# Patient Record
Sex: FEMALE | Race: BLACK OR AFRICAN AMERICAN | Employment: FULL TIME | URBAN - METROPOLITAN AREA
[De-identification: names, ages, dates, MRNs, and addresses within clinical notes are randomized per-mention and may not be internally consistent; named-entity substitution may affect disease eponyms.]

---

## 2019-04-05 ENCOUNTER — HOSPITAL ENCOUNTER (OUTPATIENT)
Dept: PHYSICAL THERAPY | Age: 59
Discharge: HOME | End: 2019-04-05
Payer: COMMERCIAL

## 2019-04-05 ENCOUNTER — APPOINTMENT (OUTPATIENT)
Dept: PHYSICAL THERAPY | Age: 59
End: 2019-04-05
Payer: COMMERCIAL

## 2019-04-05 PROCEDURE — 97110 THERAPEUTIC EXERCISES: CPT

## 2019-04-05 PROCEDURE — 97162 PT EVAL MOD COMPLEX 30 MIN: CPT

## 2019-04-05 NOTE — PROGRESS NOTES
PHYSICAL THERAPY - DAILY TREATMENT NOTE Patient Name: Sabas Nyhan        Date: 2019 : 1960   YES Patient  Verified Visit #:   1     Insurance: Payor: BLUE CROSS / Plan: [place] BLUE [place] FEDERAL / Product Type: PPO / In time: 10:10 Out time: 11:20 Total Treatment Time: 70 Medicare Time Tracking (below) Total Timed Codes (min):  8 1:1 Treatment Time:  8  
TREATMENT AREA = Right hip pain [M25.551] SUBJECTIVE Pain Level (on 0 to 10 scale):    / 10 Medication Changes/New allergies or changes in medical history, any new surgeries or procedures? YES    If yes, update Summary List  
Subjective Functional Status/Changes:  []  No changes reported CC: Right hip pain and entire right sided pain. HARIKA/HPI: Pt reports chronic right hip pain however 2 weeks ago she had to go to the ER d/t right sided pain, weakness and a migraine. Pt denies any HARIKA/trauma to cause the injury. Pt reports the MD r/o stroke. Pt reports her migraine has improved. Pt reports pain on right side. Pt reports numbness/tingling along right thigh and hand. Pt reports numbness down legs get worse when she stands up. Warm shower makes symptoms feel better. Pt denies any saddle paraesthesia or changes in bowel/bladder. Pt had imaging and reports protrusion on hips. perpt, according to her imaging the hip is getting worse. Pt able to go down stairs but going up stairs is difficult. Pt sleep is interrupted every night. 3 days this week her sleep has been interrupted. Pt reports the inability to \"lift stuff up\" Pt reports 3 week hx of right shoulder pain. Pt is RHD. Pt shoulder pain is aggravated by lifting and relieved by movement below shoulder height. Pt has a hx of dropping items. Pt reports it takes her 2 hours to get mobile in the morning. Pmhx:Chronic hip pain Symptoms Pain Today:6/10 Best:10 Worst:10/10 Aggravating factors:Standing, walking, stairs Relieving factors: hot shower, medication, move around, laying on left side Occupational tasks: : mostly sitting Difficulty/Pain with functional activities: 
[x] Yes [] No   Sit<>stand 
[x] Yes [] No Squatting 
[x] Yes [] No Lifting 
[x] Yes [] No Sitting Reece Lees - No Walking (10 minutes) [x] Yes [] No Stairs OBJECTIVE Palpation: Pt TTP along right lateral hip, length of ITB, middle trap, rhomboids delto-pectoral groove and posterior cuff. Gait:Pt ambulated independently with bilateral trendelenburgh Stair ambulation: Pt ambulated 3 steps with bilateral HR demonstrating step to pattern leading with left (ascending) leading with right (descending) Balance: 
Rhomberg EO: 30 seconds Rhomberg EC: 30 seconds ROM / Strength Shoulder A/PROM Left Right Flexion [facility] Abduction [facility]  
ER [facility] WFL  
IR T8 L4 Extension [facility] WFL  
 
    LE                                          AROM                     Strength (1-5) Left Right Left Right Hip Flexion 85 deg 85 deg 4/5 4-/5 Extension 0 deg 0 deg 4/5 4-/5 Abduction   4/5 4-/5 Knee Flexion WNL WNL 4+/5 4+/5 Extension WNL WNL 4/5 4/5 Ankle Plantarflexion   4-/5 4-/5 Dorsiflexion   4-/5 4-/5  
 
UE Strength Left Right Shoulder flexion 4/5 4-/5 Shoulder ext. 4+/5 4+/5 Shoulder abd. 4/5 3/5 Shoulder IR 4+/5 4/5 Shoulder ER 4+/5 4/5 Elbow flexion 4/5 4/5 Elbow extension 4/5 4/5  strength 20 kg 23 kg Flexibility:  
Hamstrings: (L) Tightness= [x] WNL   [] Min   [] Mod   [] Severe (R) Tightness= [x] WNL   [] Min   [] Mod   [] Severe Quadriceps: (L) Tightness= [] WNL   [] Min   [x] Mod   [] Severe (R) Tightness= [] WNL   [] Min   [x] Mod   [] Severe Gastroc: (L) Tightness= [] WNL   [x] Min   [] Mod   [] Severe (R) Tightness= [] WNL   [x] Min   [] Mod   [] Severe Special tests: 
Scour: (+) right Howard test  Positive (bilaterally) Therapeutic Procedures: 
Min Procedure Specifics + Rationale  
n/a [x]  Patient Education (performed throughout session) [x] Review HEP [] Progressed/Changed HEP based on:  
[] proper performance and advancement of Therex/TA [] reduction in pain level   
[] increased functional capacity [] change in directional preference  
8 [x] Therapeutic Exercise [x]  See Flowsheet Rationale: increase ROM and increase strength to improve the patients ability to participate in ADL's Modality rationale: decrease inflammation, decrease pain, increase tissue extensibility and increase muscle contraction/control to improve the patients ability to perform ADL's with greater ease Min Type Additional Details 10 [x]  Heat          [] pre-TOBIAS          [x] post-TOBIAS Location: Right shoulder and hip [x] seated              [] prone 
[] legs elevated    [] legs flat [x] Skin assessment post-treatment:  [x]intact [x]redness- no adverse reaction 
     []redness  adverse reaction:  
Other Objective/Functional Measures: 
 
See objective measurements above. Pt demonstrated and verbalized independence with HEP. Post Treatment Pain Level (on 0 to 10) scale:   6 / 10 ASSESSMENT Assessment/Changes in Function: See POC Justification for Eval Code Complexity: Moderate Patient History (low 0, mod 1-2, high 3-4): Moderate: Chronic hip and 3 week hx of right shoulder pain Examination (low 1-2, mod 3+, high 4+): Moderate: Decreased UE/LE strength, decreased activity tolerance, abnormal gait mechanics Clinical Presentation (low: stable/uncomplicated; mod: evolving; high: unstable/unpredictable): Moderate Clinical Decision Making (low , mod 26-74, high 1-25): Moderate: FOTO: 31 
 
[x]  See Plan of Care 
[]  See Progress Note/ Recertification 
[]  See Discharge Summary Patient will continue to benefit from skilled PT services to modify and progress therapeutic interventions, address functional mobility deficits, address ROM deficits, address strength deficits, analyze and address soft tissue restrictions, analyze and cue movement patterns, analyze and modify body mechanics/ergonomics, assess and modify postural abnormalities, address imbalance/dizziness and instruct in home and community integration  to attain remaining goals Progress toward goals / Updated goals: 
See POC PLAN [x]  Upgrade activities as tolerated 
[x]  Update interventions per flow sheet YES Continue plan of care  
[]  Discharge due to :   
[]  Other:   
 
Therapist: Stephan Chavira DPT Date: 4/5/2019 Time: 7:40 AM  
 
Future Appointments Date Time Provider Dong Isaacs 4/5/2019 10:00 AM Davey Golden [facility] AT [facility]

## 2019-04-05 NOTE — PROGRESS NOTES
Huyen Brandon 31  [facility] PHYSICAL THERAPY 
[address], [place], [place] [address] [place] - Phone: [phone]  Fax: [phone] PLAN OF CARE / STATEMENT OF MEDICAL NECESSITY FOR PHYSICAL THERAPY SERVICES Patient Name: Mounika Santos : 1960 Medical  
Diagnosis: Right hip pain [M25.551] Treatment Diagnosis: Right hip and shoulder pain Onset Date: Hip: chronic Shoulder: 3 weeks Referral Source: Brea Nail* Start of Care [facility]): 2019 Prior Hospitalization: See medical history Provider #: [id number] Prior Level of Function: Progressive decline in ability to complete ADL's and functional activities. Comorbidities: Osteoporosis, DM, HTN, BMI Medications: Verified on Patient Summary List  
The Plan of Care and following information is based on the information from the initial evaluation.  
=========================================================================================== Assessment / key information:  Pt is a 63year old female who presents to PT today with c/ochronic right hip and 3 week hx of right shoulder pain. Pt initially went to the Er d/t possible CVA however CVA r/o. Upon evaluation, signs and symptoms consistent with  Right hip OA and right shoulder strain/sprain. Pt overall condition can be linked to a general deconditioning. Pt presents with abnormal posture, decreased UE/LE strength, decreased UE/LE flexibility and decreased activity tolerance. Pt report an overall walking/standing tolerance of 10 minutes and leads a sedentary lifestyle. Pt ambulates independently however demonstrates a bilateral trendelenburgh. Pt to initiate rehab program with Aquatherapy then transition to a land based program as she becomes independent with program. Pt would benefit from skilled PT To address functional deficits listed above.   
 
OBJECTIVE 
  
Palpation: Pt TTP along right lateral hip, length of ITB, middle trap, rhomboids delto-pectoral groove and posterior cuff.  
  
Gait:Pt ambulated independently with bilateral trendelenburgh  
  
Stair ambulation: Pt ambulated 3 steps with bilateral HR demonstrating step to pattern leading with left (ascending) leading with right (descending) 
  
Balance: 
Rhomberg EO: 30 seconds Rhomberg EC: 30 seconds 
  
ROM / Strength Shoulder A/PROM Left Right Flexion [facility] Abduction [facility]  
ER [facility] WFL  
IR T8 L4 Extension [facility] WFL  
  
    LE                                          AROM                     Strength (1-5)     Left Right Left Right Hip Flexion 85 deg 85 deg 4/5 4-/5  
  Extension 0 deg 0 deg 4/5 4-/5  
  Abduction     4/5 4-/5 Knee Flexion WNL WNL 4+/5 4+/5  
  Extension WNL WNL 4/5 4/5 Ankle Plantarflexion     4-/5 4-/5  
  Dorsiflexion     4-/5 4-/5  
  
UE Strength 
  Left Right Shoulder flexion 4/5 4-/5 Shoulder ext. 4+/5 4+/5 Shoulder abd. 4/5 3/5 Shoulder IR 4+/5 4/5 Shoulder ER 4+/5 4/5 Elbow flexion 4/5 4/5 Elbow extension 4/5 4/5  strength 20 kg 23 kg  
  
  
Flexibility:  
Hamstrings: (L) Tightness= [x] WNL   [] Min   [] Mod   [] Severe (R) Tightness= [x] WNL   [] Min   [] Mod   [] Severe Quadriceps: (L) Tightness= [] WNL   [] Min   [x] Mod   [] Severe (R) Tightness= [] WNL   [] Min   [x] Mod   [] Severe Gastroc: (L) Tightness= [] WNL   [x] Min   [] Mod   [] Severe (R) Tightness= [] WNL   [x] Min   [] Mod   [] Severe 
  
Special tests: 
Scour: (+) right Howard test     Positive (bilaterally) 
=========================================================================================== Eval Complexity: History: MEDIUM  Complexity : 1-2 comorbidities / personal factors will impact the outcome/ POC Exam:MEDIUM Complexity : 3 Standardized tests and measures addressing body structure, function, activity limitation and / or participation in recreation  Presentation: MEDIUM Complexity : Evolving with changing characteristics  Clinical Decision Making:MEDIUM Complexity : FOTO score of 26-74Overall Complexity:MEDIUM 
 
FOTO score: 31: which indicates 69 % of functional disability. Problem List: pain affecting function, decrease ROM, decrease strength, edema affecting function, impaired gait/ balance, decrease ADL/ functional abilitiies, decrease activity tolerance, decrease flexibility/ joint mobility and decrease transfer abilities Treatment Plan may include any combination of the following: Therapeutic exercise, Therapeutic activities, Neuromuscular re-education, Physical agent/modality, Gait/balance training, Manual therapy, Aquatic therapy, Patient education, Self Care training, Functional mobility training, Home safety training and Stair training Patient / Family readiness to learn indicated by: asking questions, trying to perform skills and interest 
Persons(s) to be included in education: patient (P) Barriers to Learning/Limitations: None Measures taken: NA  
Patient Goal (s): Improve mobility Patient self reported health status: fair Rehabilitation Potential: good ? Short Term Goals: To be accomplished in  2  weeks: 1. Pt will be compliant with HEP for symptom management at home. 2. Pt will report a resting pain level of 3/10 in order to facilitate compliance with PT program.  
3. Pt will demonstrate independence with Aquatics program in order to facilitate land based PT program.  
? Long Term Goals: To be accomplished in  4  weeks: 1. Pt will be independent with HEP at D/C for self management. 2. Pt will improve right shoulder abduction gross strength to at least 4/5 in order to improve ability to place object on [facility] 3. Pt to increase FOTO score to > 48 to indicate improved functional independence.  
4. Pt will report a walking/standing tolerance of at least 35 minutes in order to facilitate return to PLOF. Frequency / Duration:   Patient to be seen  2  times per week for 4-6  weeks: 
Patient / Caregiver education and instruction: self care, activity modification and exercises Therapist Signature: Briana Love PT Date: 4/5/2019 Certification Period: NA Time: 7:39 AM  
=========================================================================================== I certify that the above Physical Therapy Services are being furnished while the patient is under my care. I agree with the treatment plan and certify that this therapy is necessary. Physician Signature:       Date:      Time:  Please sign and return to [facility] [employer] or you may fax the signed copy to [phone]. Thank you.

## 2019-04-08 ENCOUNTER — HOSPITAL ENCOUNTER (OUTPATIENT)
Dept: PHYSICAL THERAPY | Age: 59
Discharge: HOME | End: 2019-04-08
Payer: COMMERCIAL

## 2019-04-08 PROCEDURE — 97113 AQUATIC THERAPY/EXERCISES: CPT

## 2019-04-08 NOTE — PROGRESS NOTES
PHYSICAL THERAPY - DAILY TREATMENT NOTE - AQUATICS Patient Name: Chay Tatum        Date: 2019 : 1960   YES Patient  Verified Visit #:   2     Insurance: Payor: BLUE CROSS / Plan: [place] BLUE [place]OSS FEDERAL / Product Type: PPO / In time: 200 Out time: 254 Total Treatment Time: 47 Medicare/BCBS Time Tracking (below) Total Timed Codes (min):  54 1:1 Treatment Time:  54 TREATMENT AREA =  Right hip pain [M25.551] SUBJECTIVE Pain Level (on 0 to 10 scale):  5  / 10 Medication Changes/New allergies or changes in medical history, any new surgeries or procedures? NO    If yes, update Summary List  
Subjective Functional Status/Changes:  []  No changes reported Pt report noncompliance with HEP OBJECTIVE 54 min Therapeutic Exercise:  [x]  AQUATIC THERAPY Rationale:      increase strength, increase ROM, and improve balance to improve the patient's ability to perform ADL's and ambulate with less pain and increase activity tolerance. [x]   Patient Education:  Continue Aquatic Therapy and HEP as instructed Forward, Retrograde and Lateral Walking at the beginning/end of session with very little ARNALDO assistance. LE exercises performed with bilateral UE support including heel raises, hip 3-way, mini squats, knee extensions and leg curls. UE exercises incorporated UE flexion, abduction, elbow flexion/extension, lumbo thoracic rotation and cues for engaging core musculature and maintaining upright posture. UE exercise resistance:                                                                                                       
     [] none/wrist weights                                                                             
     [] small water weights                                                    
     [x] medium water weights [] large water weights                                                   
     [] back supported on wall Dynamic Balance Assessed as poor. Pt was able to perform: [x] SLS [] Tandem with: [x] EO [] EC Pt required SBA assistance entering/exiting the pool. Post Treatment Pain Level (on 0 to 10) scale:   5  / 10 ASSESSMENT Assessment/Changes in Function:  
 
Patient tolerated treatment session poorly and expressed continuous right hip pain. During water weight exercises she reported right hand numbness that extends to all fingers. []  See Progress Note/Recertification Patient will continue to benefit from skilled PT services to analyze,, cue,, progress,, modify,, demonstrate,, instruct, and address, movement patterns,, therapeutic interventions,, postural abnormalities,, soft tissue restrictions,, ROM,, strength,, functional mobility,, body mechanics/ergonomics, and home and community integration, to attain remaining goals. Progress toward goals / Updated goals: 
1st session since initial evaluation, no notable progress PLAN 
 
[]  Upgrade activities as tolerated YES Continue plan of care  
[]  Discharge due to :   
[]  Other:   
 
Therapist: Prakash Scott DPT Date: 4/8/2019 Time: 3:21 PM  
 
Future Appointments Date Time Provider Dong Isaacs 4/12/2019 11:00 AM [facility]  
4/15/2019  2:00 PM [facility]  
4/19/2019 11:00 AM Kristy Duffy PTA [facility]  
4/22/2019  2:00 PM [address] [place] [address]  
4/26/2019 11:00 AM [facility]  
4/29/2019  2:00 PM [facility]

## 2019-04-12 ENCOUNTER — HOSPITAL ENCOUNTER (OUTPATIENT)
Dept: PHYSICAL THERAPY | Age: 59
Discharge: HOME | End: 2019-04-12
Payer: COMMERCIAL

## 2019-04-12 ENCOUNTER — APPOINTMENT (OUTPATIENT)
Dept: PHYSICAL THERAPY | Age: 59
End: 2019-04-12
Payer: COMMERCIAL

## 2019-04-12 PROCEDURE — 97150 GROUP THERAPEUTIC PROCEDURES: CPT

## 2019-04-12 NOTE — PROGRESS NOTES
PHYSICAL THERAPY - DAILY TREATMENT NOTE - AQUATICS Patient Name: Elaina Nru        Date: 2019 : 1960   YES Patient  Verified Visit #:   3     Insurance: Payor: BLUE CROSS / Plan: [place] BLUE CROSS FEDERAL / Product Type: PPO / In time: 11:00 Out time: 11:50 Total Treatment Time: 50 Medicare/BCBS [place] Time Tracking (below) Total Timed Codes (min):  50 1:1 Treatment Time:  0  
TREATMENT AREA =  Right hip pain [M25.551] SUBJECTIVE Pain Level (on 0 to 10 scale):  4  / 10 Medication Changes/New allergies or changes in medical history, any new surgeries or procedures? NO    If yes, update Summary List  
Subjective Functional Status/Changes:  []  No changes reported Pt reports being a little sore after her first aquatic therapy session last week. OBJECTIVE 
50 
(0) min Therapeutic Exercise:   [x]  Aquatic Therapy Rationale:      increase strength, increase ROM, and improve balance to improve the patients ability to perform ADL's and ambulate with less pain and increase activity tolerance. [x]   Patient Education:  Continue Aquatic Therapy and HEP as instructed UE exercise resistance:                                                  LE exercises:                                                           
     [x] wrist                                                                           []  thera-band resistance 
     [] small water weights                                                   [] no UE support 
     [] medium water weights                                              []  1 UE support  
     [] large water weights                                                   [x]  2 UE support    
     [] back supported on wall 
     [] thera-band SLS UE support:                
     [] both UE                    
     [] 1 UE 
     [] 1 finger/fingers 
     []  none 
     [] EC 
  
 Post Treatment Pain Level (on 0 to 10) scale:   5  / 10 Other Utilized wrist cuff weights with UE ex secondary to pt's reports of right hand numbness last session. Mod VCing for posture and form with aquatic ex. Pt with c/o intermittent right hip pain with aquatic ex. Cued pt to perform therapeutic exercise in decreased AROM and reps as needed for pain. ASSESSMENT Assessment/Changes in Function: Pt with improved tolerance to UE therapeutic exercise with cuff weights. []  See Progress Note/Recertification Patient will continue to benefit from skilled PT services to analyze,, cue,, progress,, modify,, demonstrate,, instruct, and address, movement patterns,, therapeutic interventions,, postural abnormalities,, soft tissue restrictions,, ROM,, strength,, functional mobility,, body mechanics/ergonomics, and home and community integration, to attain remaining goals. Progress toward goals / Updated goals: 1. Pt will be compliant with HEP for symptom management at home. 2. Pt will report a resting pain level of 3/10 in order to facilitate compliance with PT program.  
3. Pt will demonstrate independence with Aquatics program in order to facilitate land based PT program.  Mod VCing for form PLAN 
 
[]  Upgrade activities as tolerated YES Continue plan of care  
[]  Discharge due to :   
[]  Other:   
 
Therapist: Mayra Weaver PTA Date: 4/12/2019 Time: 3:59 PM  
 
Future Appointments Date Time Provider Dong Isaacs 4/15/2019  2:00 PM [address]  
4/19/2019 11:00 AM Harjeet Hughes PTA [address]  
4/22/2019  2:00 PM [address] [place] [address] Drive  
4/26/2019 11:00 AM Pa Ye [address]  
4/29/2019  2:00 PM [address]

## 2019-04-15 ENCOUNTER — HOSPITAL ENCOUNTER (OUTPATIENT)
Dept: PHYSICAL THERAPY | Age: 59
Discharge: HOME | End: 2019-04-15
Payer: COMMERCIAL

## 2019-04-15 PROCEDURE — 97113 AQUATIC THERAPY/EXERCISES: CPT

## 2019-04-15 NOTE — PROGRESS NOTES
PHYSICAL THERAPY - DAILY TREATMENT NOTE - AQUATICS Patient Name: Gregoria Whitney        Date: 4/15/2019 : 1960   YES Patient  Verified Visit #:   4     Insurance: Payor: BLUE CROSS / Plan: [place] BLUE CROSS FEDERAL / Product Type: PPO / In time: 200 Out time: 300 Total Treatment Time: 60 Medicare/BCBS Time Tracking (below) Total Timed Codes (min):  60 1:1 Treatment Time:  8  
TREATMENT AREA =  Right hip pain [M25.551] SUBJECTIVE Pain Level (on 0 to 10 scale):  7  / 10 Medication Changes/New allergies or changes in medical history, any new surgeries or procedures? NO    If yes, update Summary List  
Subjective Functional Status/Changes:  []  No changes reported Pt reports the last session was better and that nothing remarkable has happened since last session. OBJECTIVE 
 
60(8) min Therapeutic Exercise:  [x]  AQUATIC THERAPY Rationale:      increase strength, increase ROM, and improve balance to improve the patient's ability to perform ADL's and ambulate with less pain and increase activity tolerance. [x]   Patient Education:  Continue Aquatic Therapy and HEP as instructed Forward, Retrograde and Lateral Walking at the beginning/end of session with no UE assistance. LE exercises performed with intermittent UE support including heel raises, hip 3-way, mini squats, knee extensions and leg curls. UE exercises incorporated UE flexion, abduction, elbow flexion/extension, lumbo thoracic rotation and cues for engaging core musculature and maintaining upright posture. UE exercise resistance:                                                                                                       
     [] none/wrist weights                                                                             
     [] small water weights [x] medium water weights                                             
     [] large water weights                                                   
     [] back supported on wall Dynamic Balance Assessed as fair. Pt was able to perform: [x] SLS [] Tandem with: [x] EO [x] EC Pt required none assistance entering/exiting the pool. Post Treatment Pain Level (on 0 to 10) scale:   6--5  / 10 ASSESSMENT Assessment/Changes in Function:  
 
Patient tolerated treatment session well and is progressing well towards goals. Pain remains elevated but decreased pain noted at end of session today. Decreased effort demonstrated. []  See Progress Note/Recertification Patient will continue to benefit from skilled PT services to analyze,, cue,, progress,, modify,, demonstrate,, instruct, and address, movement patterns,, therapeutic interventions,, postural abnormalities,, soft tissue restrictions,, ROM,, strength,, functional mobility,, body mechanics/ergonomics, and home and community integration, to attain remaining goals. Progress toward goals / Updated goals: · Short Term Goals: To be accomplished in  2  weeks: 1. Pt will be compliant with HEP for symptom management at home. 2. Pt will report a resting pain level of 3/10 in order to facilitate compliance with PT program.  
3. Pt will demonstrate independence with Aquatics program in order to facilitate land based PT program. 
improving independence · Long Term Goals: To be accomplished in  4  weeks: 1. Pt will be independent with HEP at D/C for self management. 2. Pt will improve right shoulder abduction gross strength to at least 4/5 in order to improve ability to place object on [facility] 3. Pt to increase FOTO score to > 48 to indicate improved functional independence. 4. Pt will report a walking/standing tolerance of at least 35 minutes in order to facilitate return to PLOF.   
 
 
PLAN 
 
 []  Upgrade activities as tolerated YES Continue plan of care  
[]  Discharge due to :   
[]  Other:   
 
Therapist: Yury Junior DPT Date: 4/15/2019 Time: 2:14 PM  
 
Future Appointments Date Time Provider oDng Isaacs 4/19/2019 11:00 AM Chano Hodge PTA [facility]  
4/22/2019  2:00 PM [address] [place] [address]  
4/26/2019 11:00 AM Sindi Javier [facility]  
4/29/2019  2:00 PM Christine [facility]

## 2019-04-26 ENCOUNTER — HOSPITAL ENCOUNTER (OUTPATIENT)
Dept: PHYSICAL THERAPY | Age: 59
Discharge: HOME | End: 2019-04-26
Payer: COMMERCIAL

## 2019-04-26 PROCEDURE — 97113 AQUATIC THERAPY/EXERCISES: CPT

## 2019-04-26 NOTE — PROGRESS NOTES
PHYSICAL THERAPY - DAILY TREATMENT NOTE - AQUATICS Patient Name: Chandrakant Kang        Date: 2019 : 1960   YES Patient  Verified Visit #:   5     Insurance: Payor: BLUE CROSS / Plan: [place] / Product Type: PPO / In time: 11:00 Out time: 11:50 Total Treatment Time: 50 Medicare/BCBS [place] Time Tracking (below) Total Timed Codes (min):  50 1:1 Treatment Time:  25 TREATMENT AREA =   Right hip pain [M25.551] SUBJECTIVE Pain Level (on 0 to 10 scale):  -8  / 10 Medication Changes/New allergies or changes in medical history, any new surgeries or procedures? NO    If yes, update Summary List  
Subjective Functional Status/Changes:  []  No changes reported Pt states that she is having a flare up today so her right hip is bothering her more. OBJECTIVE 
50 
(25) min Therapeutic Exercise:   [x]  Aquatic Therapy Rationale:      increase strength, increase ROM, and improve balance to improve the patients ability to perform ADL's and ambulate with less pain and increase activity tolerance. [x]   Patient Education:  Continue Aquatic Therapy and HEP as instructed UE exercise resistance:                                                  LE exercises:                                                           
     [] none                                                                             []  thera-band resistance 
     [x] small water weights                                                   [] no UE support 
     [] medium water weights                                              [x]  1 UE support  
     [] large water weights                                                   [x]  2 UE support    
     [] back supported on wall 
     [] thera-band SLS UE support:                
     [] both UE                    
     [] 1 UE 
     [] 1 finger/fingers 
     []  none 
     [] EC 
  
 Post Treatment Pain Level (on 0 to 10) scale:   7  / 10 Other Pt with c/o increased right hip pain with LE ex. Cued pt to decrease AROM and reps as needed for pain. Mod VCing for form with therapeutic exercise. ASSESSMENT Assessment/Changes in Function: Pt with decreased tolerance to LE ex right LE, but still with reduced pain level at end of session. []  See Progress Note/Recertification Patient will continue to benefit from skilled PT services to analyze,, cue,, progress,, modify,, demonstrate,, instruct, and address, movement patterns,, therapeutic interventions,, postural abnormalities,, soft tissue restrictions,, ROM,, strength,, functional mobility,, body mechanics/ergonomics, and home and community integration, to attain remaining goals. Familia Barron Progress toward goals / Updated goals: 1. Pt will be compliant with HEP for symptom management at home. 2. Pt will report a resting pain level of 3/10 in order to facilitate compliance with PT program. Pt c/o flare up in right hip pain today, 7-8/10 3. Pt will demonstrate independence with Aquatics program in order to facilitate land based PT program. Mod VCing for form · Long Term Goals: To be accomplished in  4  weeks: 1. Pt will be independent with HEP at D/C for self management. 2. Pt will improve right shoulder abduction gross strength to at least 4/5 in order to improve ability to place object on [facility] 3. Pt to increase FOTO score to > 48 to indicate improved functional independence. 4. Pt will report a walking/standing tolerance of at least 35 minutes in order to facilitate return to PLOF. PLAN 
 
[]  Upgrade activities as tolerated YES Continue plan of care  
[]  Discharge due to :   
[]  Other:   
 
Therapist: Jenny Galeas PTA Date: 4/26/2019 Time: 4:43 PM  
 
Future Appointments Date Time Provider Dong Isaacs 4/29/2019  2:00 PM [facility] AT [facility]

## 2019-04-29 ENCOUNTER — HOSPITAL ENCOUNTER (OUTPATIENT)
Dept: PHYSICAL THERAPY | Age: 59
Discharge: HOME | End: 2019-04-29
Payer: COMMERCIAL

## 2019-04-29 PROCEDURE — 97113 AQUATIC THERAPY/EXERCISES: CPT

## 2019-04-29 NOTE — PROGRESS NOTES
PHYSICAL THERAPY - DAILY TREATMENT NOTE - AQUATICS Patient Name: Rosa Lopez        Date: 2019 : 1960   YES Patient  Verified Visit #:     Insurance: Payor: BLUE CROSS / Plan: [place] / Product Type: PPO / In time: 200 Out time: 307 Total Treatment Time: 79 Medicare/BCBS Time Tracking (below) Total Timed Codes (min):  67 1:1 Treatment Time:  23 TREATMENT AREA =  Right hip pain [M25.551] SUBJECTIVE Pain Level (on 0 to 10 scale):  7  / 10 Medication Changes/New allergies or changes in medical history, any new surgeries or procedures? NO    If yes, update Summary List  
Subjective Functional Status/Changes:  []  No changes reported Pt reports that aquatic therapy is helping and that she is able to perform exercises when she is having pain. She reports that she feels that her standing tolerance and flexibility has improved. Reports her shoulder isnt dropping as much and not giving out. OBJECTIVE 
 
67(23) min Therapeutic Exercise:  [x]  AQUATIC THERAPY Rationale:      increase strength, increase ROM, and improve balance to improve the patient's ability to perform ADL's and ambulate with less pain and increase activity tolerance. [x]   Patient Education:  Continue Aquatic Therapy and HEP as instructed Forward, Retrograde and Lateral Walking at the beginning/end of session with no assistance. LE exercises performed with intermittent UE support including heel raises, hip 3-way, mini squats, knee extensions and leg curls. UE exercises incorporated UE flexion, abduction, elbow flexion/extension, lumbo thoracic rotation and cues for engaging core musculature and maintaining upright posture.   
  
UE exercise resistance:                                                                                                      
     [] none/wrist weights [] small water weights                                                    
     [x] medium water weights                                             
     [] large water weights                                                   
     [] back supported on wall Dynamic Balance assessed as poor+. Pt was able to perform: [x] SLS [] Tandem with: [x] EO [x] EC Pt required Min/mod VC throughout session for proper form and increased safety. Pt required no assistance entering/exiting the pool. UE Strength 
  Right Shoulder flexion 4+/5 Shoulder ext. 4+/5 Shoulder abd. 4+/5 Shoulder IR 5/5 Shoulder ER 4+/5 Elbow flexion 5/5 Elbow extension 5/5 Post Treatment Pain Level (on 0 to 10) scale:   7  / 10 ASSESSMENT Assessment/Changes in Function:  
 
See PN Patient's Response to Treatment: 
[] Decreased Pain               [] Increased Pain             [x] No Change in Symptoms 
[] Decreased Cues Required                                    [] Improved Standing Balance 
 
  
[]  See Progress Note/Recertification Patient will continue to benefit from skilled PT services to analyze,, cue,, progress,, modify,, demonstrate,, instruct, and address, movement patterns,, therapeutic interventions,, postural abnormalities,, soft tissue restrictions,, ROM,, strength,, functional mobility,, body mechanics/ergonomics, and home and community integration, to attain remaining goals. Progress toward goals / Updated goals: · Short Term Goals: To be accomplished in  2  weeks: 1. Pt will be compliant with HEP for symptom management at home. 2. Pt will report a resting pain level of 3/10 in order to facilitate compliance with PT program.  
NOT MET: Resting Pain 6/10 3. Pt will demonstrate independence with Aquatics program in order to facilitate land based PT program.  
· Long Term Goals: To be accomplished in  4  weeks: 1. Pt will be independent with HEP at D/C for self management. 2. Pt will improve right shoulder abduction gross strength to at least 4/5 in order to improve ability to place object on [facility] 3. Pt to increase FOTO score to > 48 to indicate improved functional independence. NOT MET: 96/378 4. Pt will report a walking/standing tolerance of at least 35 minutes in order to facilitate return to PLOF. NOT MET: 10 minute tolerance \"its getting better\" See PN  
PLAN 
 
[]  Upgrade activities as tolerated YES Continue plan of care  
[]  Discharge due to :   
[]  Other:   
 
Therapist: Nolan So DPT Date: 4/29/2019 Time: 2:53 PM  
No future appointments.

## 2019-04-29 NOTE — PROGRESS NOTES
[address] [facility] PHYSICAL THERAPY 
[address], [place], [address] - Phone: [phone]  Fax: [phone] Progress Note/CONTINUED PLAN OF CARE for PHYSICAL THERAPY Patient Name: Hillary Gutierrez : 1960 Treatment/Medical Diagnosis: Right hip pain [M25.551] Onset Date: Hip: chronic Shoulder: 3 weeks Referral Source: Festus Cartwright* Start of Care [facility]): 2019 Prior Hospitalization: See Medical History Provider #: [id number] Medications: Verified on Patient Summary List  
Visits from [facility]: 5 Missed Visits: 1 GOALS · Short Term Goals: To be accomplished in  2  weeks: 1. Pt will be compliant with HEP for symptom management at home. MET 2. Pt will report a resting pain level of 3/10 in order to facilitate compliance with PT program.  
NOT MET: Resting Pain 6/10 3. Pt will demonstrate independence with Aquatics program in order to facilitate land based PT program.  PROGRESSING 
· Long Term Goals: To be accomplished in  4  weeks: 1. Pt will improve right shoulder abduction gross strength to at least 4/5 in order to improve ability to place object on [facility] 
MET 2. Pt to increase FOTO score to > 48 to indicate improved functional independence. NOT MET: 46/871 3. Pt will report a walking/standing tolerance of at least 35 minutes in order to facilitate return to PLOF.   
NOT MET: 10 minute tolerance \"its getting better\" [place]/Progress: Mirlande has participated in 5 aquatic therapy sessions and has only missed 1 session since starting. She reports that her standing tolerance has improved, right shoulder isn't \"giving out\" as much and that she has been able to increase her activity level since starting. Her pain rating from her last 3 sessions have averaged ~7/10 with minimal/no decrease within session. She is able to complete a full session of aquatic therapy with Min/Mod verbal cues and demonstrations.  Right Shoulder gross strength has improved (see below) and her FOTO outcome score has improve 2 points from 31/100.  
  
UE Strength 
  Right Shoulder flexion 4+/5 Shoulder ext. 4+/5 Shoulder abd. 4+/5 Shoulder IR 5/5 Shoulder ER 4+/5 Elbow flexion 5/5 Elbow extension 5/5 Patient Goal(s) has been updated and includes:  Same as SOC  
? Goals for this certification period include and are to be achieved in   4-5  weeks: 1. Pt will be independent with HEP at D/C for self management. 2. Pt to increase FOTO score to > 48 to indicate improved functional independence. 3. Pt will report a walking/standing tolerance of at least 35 minutes in order to facilitate return to PLOF.  
4. Pt will demonstrate independence with Aquatics program in order to facilitate land based PT program. 
5. Pt will report a resting pain level of 3/10 in order to facilitate compliance with PT program.  
Frequency / Duration:   Patient to be seen   2   times per week for   5    weeks: 
Assessments/Recommendations: Pt will continue with aquatic therapy to return to PLOF with option to transition to land-based therapy when appropriate. If you have any questions/comments please contact us directly at [phone]. Thank you for allowing us to assist in the care of your patient. Therapist Signature: Cecily Pink DPT Date: 4/29/2019 Certification Period: 
Reporting Period: NA 
NA Time: 4:24 PM  
NOTE TO PHYSICIAN:  PLEASE COMPLETE THE ORDERS BELOW AND FAX TO [facility]: [phone]. If you are unable to process this request in 24 hours please contact our office: [phone]. 
 
___ I have read the above report and request that my patient continue as recommended.  
___ I have read the above report and request that my patient continue therapy with the following changes/special instructions: ________________________________________________ ___ I have read the above report and request that my patient be discharged from therapy.   
 
Physician Signature:       Date:      Time:

## 2019-05-03 ENCOUNTER — HOSPITAL ENCOUNTER (OUTPATIENT)
Dept: PHYSICAL THERAPY | Age: 59
Discharge: HOME | End: 2019-05-03
Payer: COMMERCIAL

## 2019-05-03 PROCEDURE — 97113 AQUATIC THERAPY/EXERCISES: CPT

## 2019-05-03 NOTE — PROGRESS NOTES
PHYSICAL THERAPY - DAILY TREATMENT NOTE - AQUATICS Patient Name: Taurus Up        Date: 5/3/2019 : 1960   YES Patient  Verified Visit #:   7     Insurance: Payor: BLUE CROSS / Plan: [place] BLUE CROSS FEDERAL / Product Type: PPO / In time: 11:00 Out time: 11:54 Total Treatment Time: 47 Medicare/BCBS [place] Time Tracking (below) Total Timed Codes (min):  54 1:1 Treatment Time:  24 TREATMENT AREA =  Right hip pain [M25.551 SUBJECTIVE Pain Level (on 0 to 10 scale):  7  / 10 Medication Changes/New allergies or changes in medical history, any new surgeries or procedures? NO    If yes, update Summary List  
Subjective Functional Status/Changes:  []  No changes reported Pt states her right hip has been more sore the past few days. Pt reports she did fine using the medium resistance weights last aquatic class. OBJECTIVE 
54 
(24) min Therapeutic Exercise:   [x]  Aquatic Therapy Rationale:      increase strength, increase ROM, and improve balance to improve the patients ability to perform ADL's and ambulate with less pain and increase activity tolerance. [x]   Patient Education:  Continue Aquatic Therapy and HEP as instructed UE exercise resistance:                                                  LE exercises:                                                           
     [] none                                                                             []  thera-band resistance 
     [] small water weights                                                   [] no UE support [x] medium water weights                                              [x]  1 UE support  
     [] large water weights                                                   [x]  2 UE support    
     [] back supported on wall 
     [] thera-band SLS UE support:                
     [] both UE                    
     [] 1 UE 
     [] 1 finger/fingers 
     []  none [] EC Post Treatment Pain Level (on 0 to 10) scale:   6.5  / 10 Other  At end of session pt reports she didn't want to come today, but she is glad she came because it helped with her pain a little, but she feels better just coming and doing it (ex). Pt demo's grimacing during initial warm-up laps in water, but reports no increased right hip pain. ASSESSMENT Assessment/Changes in Function: Pt continue with c/o 7-8/10 right hip pain with ADL's and therapeutic activity. []  See Progress Note/Recertification Patient will continue to benefit from skilled PT services to  analyze,, cue,, progress,, modify,, demonstrate,, instruct, and address, movement patterns,, therapeutic interventions,, postural abnormalities,, soft tissue restrictions,, ROM,, strength,, functional mobility,, body mechanics/ergonomics, and home and community integration, to attain remaining goals. . 
  
  
Progress toward goals / Updated goals: 1. Pt will be independent with HEP at D/C for self management. 2. Pt to increase FOTO score to > 48 to indicate improved functional independence. 3. Pt will report a walking/standing tolerance of at least 35 minutes in order to facilitate return to PLOF.  
4. Pt will demonstrate independence with Aquatics program in order to facilitate land based PT program.  
5. Pt will report a resting pain level of 3/10 in order to facilitate compliance with PT program. pt continues with c/o 7-8/10 right hip pain PLAN 
 
[]  Upgrade activities as tolerated YES Continue plan of care  
[]  Discharge due to :   
[]  Other:   
 
Therapist: Cuauhtemoc Liriano PTA Date: 5/3/2019 Time: 3:23 PM  
 
Future Appointments Date Time Provider Dong Isaacs 5/6/2019  2:00 PM [address] [place] [address]  
5/10/2019 11:00 AM [facility]  
5/13/2019  2:00 PM [address] [place] [address]  
5/17/2019 11:00 AM [facility]  
 5/20/2019  2:00 PM [facility] AT [facility]

## 2019-05-06 ENCOUNTER — HOSPITAL ENCOUNTER (OUTPATIENT)
Dept: PHYSICAL THERAPY | Age: 59
Discharge: HOME | End: 2019-05-06
Payer: COMMERCIAL

## 2019-05-06 PROCEDURE — 97113 AQUATIC THERAPY/EXERCISES: CPT

## 2019-05-06 NOTE — PROGRESS NOTES
PHYSICAL THERAPY - DAILY TREATMENT NOTE - AQUATICS Patient Name: Tom Krause        Date: 2019 : 1960   YES Patient  Verified Visit #:   8     Insurance: Payor: BLUE CROSS / Plan: [place] BLUE CROSS FEDERAL / Product Type: PPO / In time: 200 Out time: 300 Total Treatment Time: 60 Medicare/BCBS Time Tracking (below) Total Timed Codes (min):  60 1:1 Treatment Time:  25 TREATMENT AREA =  Right hip pain [M25.551] SUBJECTIVE Pain Level (on 0 to 10 scale):  7.5  / 10 Medication Changes/New allergies or changes in medical history, any new surgeries or procedures? NO    If yes, update Summary List  
Subjective Functional Status/Changes:  []  No changes reported Pt reports no changes since last visit OBJECTIVE 
 
60(25) min Therapeutic Exercise:  [x]  AQUATIC THERAPY Rationale:      increase strength, increase ROM, and improve balance to improve the patient's ability to perform ADL's and ambulate with less pain and increase activity tolerance. [x]   Patient Education:  Continue Aquatic Therapy and HEP as instructed Forward, Retrograde and Lateral Walking at the beginning/end of session with some ARNALDO assistance. LE exercises performed with BUE/ARNALDO support including heel raises, hip 3-way, mini squats, knee extensions and leg curls. UE exercises incorporated UE flexion, abduction, elbow flexion/extension, lumbo thoracic rotation and cues for engaging core musculature and maintaining upright posture. UE exercise resistance:                                                                                                       
     [] none/wrist weights                                                                             
     [] small water weights                                                    
     [x] medium water weights [] large water weights                                                   
     [] back supported on wall Dynamic Balance assessed as fair. Pt was able to perform: [] SLS [] Tandem with: [] EO [] EC Pt required Mod VC throughout session for proper form and increased safety. Pt required No assistance entering/exiting the pool. Post Treatment Pain Level (on 0 to 10) scale:   7  / 10 ASSESSMENT Assessment/Changes in Function:  
 
Patient tolerated treatment session well and is progressing well towards goals. Continues to report 7/10 pain at beginning of sessions with little decrease at end of session. Patient's Response to Treatment: 
[x] Decreased Pain               [] Increased Pain             [] No Change in Symptoms 
[] Decreased Cues Required                                    [] Improved Standing Balance 
 
  
[]  See Progress Note/Recertification Patient will continue to benefit from skilled PT services to analyze,, cue,, progress,, modify,, demonstrate,, instruct, and address, movement patterns,, therapeutic interventions,, postural abnormalities,, soft tissue restrictions,, ROM,, strength,, functional mobility,, body mechanics/ergonomics, and home and community integration, to attain remaining goals. Progress toward goals / Updated goals: 
Pt reaching plateau in physical therapy PLAN 
 
[]  Upgrade activities as tolerated YES Continue plan of care  
[]  Discharge due to :   
[]  Other:   
 
Therapist: Cecily Pink DPT Date: 5/6/2019 Time: 3:05 PM  
 
Future Appointments Date Time Provider Dong Isaacs 5/10/2019 11:00 AM [facility]  
5/13/2019  2:00 PM [address] [place] [address]  
5/17/2019 11:00 AM [facility]  
[facility]20[facility]2019  [facility]

## 2019-05-10 ENCOUNTER — HOSPITAL ENCOUNTER (OUTPATIENT)
Dept: PHYSICAL THERAPY | Age: 59
Discharge: HOME | End: 2019-05-10
Payer: COMMERCIAL

## 2019-05-10 PROCEDURE — 97150 GROUP THERAPEUTIC PROCEDURES: CPT

## 2019-05-10 NOTE — PROGRESS NOTES
PHYSICAL THERAPY - DAILY TREATMENT NOTE - AQUATICS Patient Name: Sony Espitia        Date: 5/10/2019 : 1960   YES Patient  Verified Visit #:   9   of   15  Insurance: Payor: BLUE CROSS / Plan: [place] BLUE CROSS FEDERAL / Product Type: PPO / In time: 11:05 Out time: 11:54 Total Treatment Time: 52 Medicare/BCBS [place] Time Tracking (below) Total Timed Codes (min):  49 1:1 Treatment Time: 0  
TREATMENT AREA =  Right hip pain [M25.551] SUBJECTIVE Pain Level (on 0 to 10 scale):  7  / 10 Medication Changes/New allergies or changes in medical history, any new surgeries or procedures? NO    If yes, update Summary List  
Subjective Functional Status/Changes:  []  No changes reported Pt reports that this past week her hip pain has been worse. OBJECTIVE 
49 
(0) min Therapeutic Exercise:   [x]  Aquatic Therapy Rationale:      increase strength, increase ROM, and improve balance to improve the patients ability to perform ADL's and ambulate with less pain and increase activity tolerance. [x]   Patient Education:  Continue Aquatic Therapy and HEP as instructed UE exercise resistance:                                                  LE exercises:                                                           
     [] none                                                                             []  thera-band resistance 
     [] small water weights                                                   [] no UE support [x] medium water weights                                              [x]  1 UE support  
     [] large water weights                                                   [x]  2 UE support    
     [] back supported on wall 
     [] thera-band SLS UE support:                
     [] both UE                    
     [] 1 UE 
     [] 1 finger/fingers 
     []  none 
     [] EC Post Treatment Pain Level (on 0 to 10) scale:   6  / 10 Other  Vinnie [place] for form with aquatic therapeutic exercise. ASSESSMENT Assessment/Changes in Function:  
 
Pt reports increased right hip pain this week for no known reason; however, no change in report of pain level on pain scale. []  See Progress Note/Recertification Patient will continue to benefit from skilled PT services to analyze,, cue,, progress,, modify,, demonstrate,, instruct, and address, movement patterns,, therapeutic interventions,, postural abnormalities,, soft tissue restrictions,, ROM,, strength,, functional mobility,, body mechanics/ergonomics, and home and community integration, to attain remaining goals. Progress toward goals / Updated goals: 1. Pt will be independent with HEP at D/C for self management. 2. Pt to increase FOTO score to > 48 to indicate improved functional independence. 3. Pt will report a walking/standing tolerance of at least 35 minutes in order to facilitate return to PLOF.  
4. Pt will demonstrate independence with Aquatics program in order to facilitate land based PT program. 
5. Pt will report a resting pain level of 3/10 in order to facilitate compliance with PT program. continued c/o 6-7/10 hip pain daily PLAN 
 
[]  Upgrade activities as tolerated YES Continue plan of care  
[]  Discharge due to :   
[]  Other:   
 
Therapist: Mayra Weaver PTA Date: 5/10/2019 Time: 5:16 PM  
 
Future Appointments Date Time Provider Dong Isaacs 5/13/2019  2:00 PM [address] [place] [address]  
[facility]  
5/20/2019  2:00 PM [facility]

## 2019-05-13 ENCOUNTER — HOSPITAL ENCOUNTER (OUTPATIENT)
Dept: PHYSICAL THERAPY | Age: 59
End: 2019-05-13
Payer: COMMERCIAL

## 2019-05-17 ENCOUNTER — HOSPITAL ENCOUNTER (OUTPATIENT)
Dept: PHYSICAL THERAPY | Age: 59
Discharge: HOME | End: 2019-05-17
Payer: COMMERCIAL

## 2019-05-17 PROCEDURE — 97150 GROUP THERAPEUTIC PROCEDURES: CPT

## 2019-05-17 NOTE — PROGRESS NOTES
HYSICAL THERAPY - DAILY TREATMENT NOTE - AQUATICS Patient Name: Hamilton Bray        Date: 2019 : 1960   YES Patient  Verified Visit #:   10   of   15  Insurance: Payor: [place] / Plan: [place] [employer] / Product Type: PPO / In time: 11:05 Out time: 11:55 Total Treatment Time: 50 Medicare/[facility] Time Tracking (below) Total Timed Codes (min):  50 1:1 Treatment Time:  0  
TREATMENT AREA =  Right hip pain [M25.551] SUBJECTIVE Pain Level (on 0 to 10 scale):  5  / 10 Medication Changes/New allergies or changes in medical history, any new surgeries or procedures? NO    If yes, update Summary List  
Subjective Functional Status/Changes:  []  No changes reported Pt reports her CHF is acting up so she's not feeling good today. OBJECTIVE 
 
50 
(0) min Therapeutic Exercise:  [x]  AQUATIC THERAPY Rationale:      increase strength, increase ROM, and improve balance to improve the patient's ability to perform ADL's and ambulate with less pain and increase activity tolerance. [x]   Patient Education:  Continue Aquatic Therapy and HEP as instructed Forward, Retrograde and Lateral Walking at the beginning/end of session with no assistance. LE exercises performed with 1-2 UE support including heel raises, hip 3-way, mini squats, knee extensions and leg curls. UE exercises incorporated UE flexion, abduction, elbow flexion/extension, lumbo thoracic rotation and cues for engaging core musculature and maintaining upright posture. UE exercise resistance:                                                                                                       
     [] none/wrist weights                                                                             
     [] small water weights                                                    
     [x] medium water weights [] large water weights                                                   
     [] back supported on wall Dynamic Balance assessed as good. Pt appeared more lethargic throughout treatment session. Pt required min VC throughout session for proper form and increased safety. Pt required no assistance entering/exiting the pool. Post Treatment Pain Level (on 0 to 10) scale:   5  / 10 ASSESSMENT Assessment/Changes in Function:  
 
Patient tolerated treatment session well and is progressing well towards goals. Patient's Response to Treatment: 
[] Decreased Pain               [] Increased Pain             [x] No Change in Symptoms 
[] Decreased Cues Required                                    [] Improved Standing Balance 
 
  
[]  See Progress Note/Recertification Patient will continue to benefit from skilled PT services to analyze,, cue,, progress,, modify,, demonstrate,, instruct, and address, movement patterns,, therapeutic interventions,, postural abnormalities,, soft tissue restrictions,, ROM,, strength,, functional mobility,, body mechanics/ergonomics, and home and community integration, to attain remaining goals. Progress toward goals / Updated goals: 1. Pt will be independent with HEP at D/C for self management. 2. Pt to increase FOTO score to > 48 to indicate improved functional independence. 3. Pt will report a walking/standing tolerance of at least 35 minutes in order to facilitate return to PLOF.  
4. Pt will demonstrate independence with Aquatics program in order to facilitate land based PT program. Vasquez Corrales for form 5. Pt will report a resting pain level of 3/10 in order to facilitate compliance with PT program.  Decreased pain level of 5/10 today compared to 7/10 last 5 sessions PLAN 
 
[]  Upgrade activities as tolerated YES Continue plan of care  
[]  Discharge due to :   
[]  Other:   
 
Therapist: Eros Mathur DPT Date: 5/17/2019 Time: 4:06 PM  
 
 Future Appointments Date Time Provider Dong Isaacs 5/20/2019  2:00 PM [facility] AT [facility]

## 2019-05-20 ENCOUNTER — HOSPITAL ENCOUNTER (OUTPATIENT)
Dept: PHYSICAL THERAPY | Age: 59
End: 2019-05-20
Payer: COMMERCIAL

## 2019-06-27 NOTE — PROGRESS NOTES
[address] [facility] PHYSICAL THERAPY  [address] [facility] [address], [address] - Phone: [phone]  Fax: [phone] SUMMARY  Patient Name: Meli Mckee : 1960   Treatment/Medical Diagnosis: Right hip pain [M25.551]   Referral Source: Priyank Tucker*     Date of Initial Visit: 2019 Attended Visits: 10 Missed Visits: 2     SUMMARY OF TREATMENT  Pt participated in our aquatic therapy program which includes ambulation in water for warm-up and cool down, upper and lower extremity exercises for core stabilization and pain management. CURRENT STATUS  Pt attended initial evaluation and 9 aquatic therapy sessions. Pt with good tolerance to aquatic therapeutic exercise, but with minimal change in pain level reported. At patient's last session, she reported her CHF was acting up. Pt cancelled her last scheduled appointment on 19 reporting she had a lot of fluid. Pt was contacted regarding continued therapy; however, pt has not followed up to schedule additional appointments. Due to the inability to further their care from non-attendance, we are discharging the patient from physical therapy at this time. We appreciate the kind referral and would willingly work with this patient again, should they be able to arrange regular attendance. Your patient's health is our primary concern. RECOMMENDATIONS  Discontinue therapy due to lack of attendance or compliance. If you have any questions/comments please contact us directly at [phone]. Thank you for allowing us to assist in the care of your patient.     LPTA Signature: Ivory Jane Date: 2019   Therapist Signature: Alon Ayala DPT Time: 3:28 PM     Physician Signature:        Date:       Time: